# Patient Record
Sex: MALE | Race: WHITE | NOT HISPANIC OR LATINO | Employment: UNEMPLOYED | URBAN - NONMETROPOLITAN AREA
[De-identification: names, ages, dates, MRNs, and addresses within clinical notes are randomized per-mention and may not be internally consistent; named-entity substitution may affect disease eponyms.]

---

## 2017-08-28 ENCOUNTER — HOSPITAL ENCOUNTER (EMERGENCY)
Facility: HOSPITAL | Age: 42
Discharge: HOME | End: 2017-08-28
Attending: EMERGENCY MEDICINE | Admitting: EMERGENCY MEDICINE

## 2017-08-28 VITALS
HEART RATE: 109 BPM | SYSTOLIC BLOOD PRESSURE: 144 MMHG | DIASTOLIC BLOOD PRESSURE: 91 MMHG | BODY MASS INDEX: 24.33 KG/M2 | RESPIRATION RATE: 18 BRPM | HEIGHT: 67 IN | WEIGHT: 155 LBS | OXYGEN SATURATION: 97 % | TEMPERATURE: 98.1 F

## 2017-08-28 DIAGNOSIS — R36.9 PENILE DISCHARGE: Primary | ICD-10-CM

## 2017-08-28 LAB
BACTERIA UR QL AUTO: ABNORMAL /HPF
BILIRUB UR QL STRIP: NEGATIVE
CLARITY UR: ABNORMAL
COLOR UR: YELLOW
GLUCOSE UR STRIP-MCNC: NEGATIVE MG/DL
HGB UR QL STRIP.AUTO: ABNORMAL
HYALINE CASTS UR QL AUTO: ABNORMAL /LPF
KETONES UR QL STRIP: NEGATIVE
LEUKOCYTE ESTERASE UR QL STRIP.AUTO: ABNORMAL
MUCOUS THREADS URNS QL MICRO: ABNORMAL /HPF
NITRITE UR QL STRIP: NEGATIVE
PH UR STRIP.AUTO: 6 [PH] (ref 5–8)
PROT UR QL STRIP: NEGATIVE
RBC # UR: ABNORMAL /HPF
REF LAB TEST METHOD: ABNORMAL
SP GR UR STRIP: 1.02 (ref 1–1.03)
SQUAMOUS #/AREA URNS HPF: ABNORMAL /HPF
UROBILINOGEN UR QL STRIP: ABNORMAL
WBC CLUMPS # UR AUTO: ABNORMAL /HPF
WBC UR QL AUTO: ABNORMAL /HPF

## 2017-08-28 PROCEDURE — 96372 THER/PROPH/DIAG INJ SC/IM: CPT

## 2017-08-28 PROCEDURE — 87086 URINE CULTURE/COLONY COUNT: CPT | Performed by: EMERGENCY MEDICINE

## 2017-08-28 PROCEDURE — 99283 EMERGENCY DEPT VISIT LOW MDM: CPT

## 2017-08-28 PROCEDURE — 81001 URINALYSIS AUTO W/SCOPE: CPT | Performed by: EMERGENCY MEDICINE

## 2017-08-28 PROCEDURE — 25010000002 CEFTRIAXONE PER 250 MG: Performed by: EMERGENCY MEDICINE

## 2017-08-28 PROCEDURE — 87591 N.GONORRHOEAE DNA AMP PROB: CPT | Performed by: EMERGENCY MEDICINE

## 2017-08-28 PROCEDURE — 87491 CHLMYD TRACH DNA AMP PROBE: CPT | Performed by: EMERGENCY MEDICINE

## 2017-08-28 RX ORDER — CEFTRIAXONE SODIUM 250 MG/1
250 INJECTION, POWDER, FOR SOLUTION INTRAMUSCULAR; INTRAVENOUS ONCE
Status: COMPLETED | OUTPATIENT
Start: 2017-08-28 | End: 2017-08-28

## 2017-08-28 RX ORDER — DOXYCYCLINE 100 MG/1
200 CAPSULE ORAL ONCE
Status: COMPLETED | OUTPATIENT
Start: 2017-08-28 | End: 2017-08-28

## 2017-08-28 RX ORDER — LIDOCAINE HYDROCHLORIDE 10 MG/ML
0.9 INJECTION, SOLUTION EPIDURAL; INFILTRATION; INTRACAUDAL; PERINEURAL ONCE
Status: COMPLETED | OUTPATIENT
Start: 2017-08-28 | End: 2017-08-28

## 2017-08-28 RX ORDER — AZITHROMYCIN 250 MG/1
2000 TABLET, FILM COATED ORAL ONCE
Status: COMPLETED | OUTPATIENT
Start: 2017-08-28 | End: 2017-08-28

## 2017-08-28 RX ORDER — AZITHROMYCIN 250 MG/1
1000 TABLET, FILM COATED ORAL ONCE
Status: DISCONTINUED | OUTPATIENT
Start: 2017-08-28 | End: 2017-08-28

## 2017-08-28 RX ORDER — DOXYCYCLINE HYCLATE 50 MG/1
200 CAPSULE ORAL ONCE
Status: DISCONTINUED | OUTPATIENT
Start: 2017-08-28 | End: 2017-08-28

## 2017-08-28 RX ORDER — ONDANSETRON 4 MG/1
4 TABLET, ORALLY DISINTEGRATING ORAL ONCE
Status: COMPLETED | OUTPATIENT
Start: 2017-08-28 | End: 2017-08-28

## 2017-08-28 RX ORDER — METRONIDAZOLE 500 MG/1
2000 TABLET ORAL ONCE
Status: COMPLETED | OUTPATIENT
Start: 2017-08-28 | End: 2017-08-28

## 2017-08-28 RX ADMIN — CEFTRIAXONE SODIUM 250 MG: 250 INJECTION, POWDER, FOR SOLUTION INTRAMUSCULAR; INTRAVENOUS at 14:30

## 2017-08-28 RX ADMIN — LIDOCAINE HYDROCHLORIDE 0.9 ML: 10 INJECTION, SOLUTION EPIDURAL; INFILTRATION; INTRACAUDAL; PERINEURAL at 14:31

## 2017-08-28 RX ADMIN — METRONIDAZOLE 2000 MG: 500 TABLET ORAL at 14:55

## 2017-08-28 RX ADMIN — AZITHROMYCIN 2000 MG: 250 TABLET, FILM COATED ORAL at 14:55

## 2017-08-28 RX ADMIN — DOXYCYCLINE 200 MG: 100 CAPSULE ORAL at 14:31

## 2017-08-28 RX ADMIN — ONDANSETRON 4 MG: 4 TABLET, ORALLY DISINTEGRATING ORAL at 15:00

## 2017-08-29 LAB
C TRACH RRNA SPEC DONR QL NAA+PROBE: NEGATIVE
N GONORRHOEA DNA SPEC QL NAA+PROBE: POSITIVE

## 2017-08-30 LAB — BACTERIA SPEC AEROBE CULT: NO GROWTH

## 2021-11-03 ENCOUNTER — HOSPITAL ENCOUNTER (EMERGENCY)
Facility: HOSPITAL | Age: 46
Discharge: HOME | End: 2021-11-03
Attending: EMERGENCY MEDICINE | Admitting: EMERGENCY MEDICINE

## 2021-11-03 VITALS
SYSTOLIC BLOOD PRESSURE: 157 MMHG | OXYGEN SATURATION: 98 % | TEMPERATURE: 98.4 F | DIASTOLIC BLOOD PRESSURE: 98 MMHG | WEIGHT: 180 LBS | HEIGHT: 66 IN | HEART RATE: 110 BPM | BODY MASS INDEX: 28.93 KG/M2 | RESPIRATION RATE: 18 BRPM

## 2021-11-03 DIAGNOSIS — Z20.2 EXPOSURE TO STD: Primary | ICD-10-CM

## 2021-11-03 LAB
BILIRUB UR QL STRIP: NEGATIVE
CLARITY UR: CLEAR
COLOR UR: YELLOW
GLUCOSE UR STRIP-MCNC: NEGATIVE MG/DL
HGB UR QL STRIP.AUTO: NEGATIVE
KETONES UR QL STRIP: NEGATIVE
LEUKOCYTE ESTERASE UR QL STRIP.AUTO: NEGATIVE
NITRITE UR QL STRIP: NEGATIVE
PH UR STRIP.AUTO: 5.5 [PH] (ref 5–8)
PROT UR QL STRIP: NEGATIVE
SP GR UR STRIP: <=1.005 (ref 1–1.03)
UROBILINOGEN UR QL STRIP: NORMAL

## 2021-11-03 PROCEDURE — 87591 N.GONORRHOEAE DNA AMP PROB: CPT | Performed by: PHYSICIAN ASSISTANT

## 2021-11-03 PROCEDURE — 81003 URINALYSIS AUTO W/O SCOPE: CPT | Performed by: PHYSICIAN ASSISTANT

## 2021-11-03 PROCEDURE — 87491 CHLMYD TRACH DNA AMP PROBE: CPT | Performed by: PHYSICIAN ASSISTANT

## 2021-11-03 PROCEDURE — 25010000002 CEFTRIAXONE PER 250 MG: Performed by: PHYSICIAN ASSISTANT

## 2021-11-03 PROCEDURE — 96372 THER/PROPH/DIAG INJ SC/IM: CPT

## 2021-11-03 PROCEDURE — 99283 EMERGENCY DEPT VISIT LOW MDM: CPT

## 2021-11-03 RX ORDER — LIDOCAINE HYDROCHLORIDE 10 MG/ML
2.1 INJECTION, SOLUTION EPIDURAL; INFILTRATION; INTRACAUDAL; PERINEURAL ONCE
Status: COMPLETED | OUTPATIENT
Start: 2021-11-03 | End: 2021-11-03

## 2021-11-03 RX ORDER — DOXYCYCLINE 100 MG/1
100 CAPSULE ORAL 2 TIMES DAILY
Qty: 20 CAPSULE | Refills: 0 | Status: SHIPPED | OUTPATIENT
Start: 2021-11-03 | End: 2021-11-13

## 2021-11-03 RX ORDER — CEFTRIAXONE 1 G/1
1000 INJECTION, POWDER, FOR SOLUTION INTRAMUSCULAR; INTRAVENOUS ONCE
Status: COMPLETED | OUTPATIENT
Start: 2021-11-03 | End: 2021-11-03

## 2021-11-03 RX ORDER — AZITHROMYCIN 250 MG/1
1000 TABLET, FILM COATED ORAL ONCE
Status: COMPLETED | OUTPATIENT
Start: 2021-11-03 | End: 2021-11-03

## 2021-11-03 RX ADMIN — LIDOCAINE HYDROCHLORIDE 2.1 ML: 10 INJECTION, SOLUTION EPIDURAL; INFILTRATION; INTRACAUDAL; PERINEURAL at 13:01

## 2021-11-03 RX ADMIN — CEFTRIAXONE SODIUM 1000 MG: 1 INJECTION, POWDER, FOR SOLUTION INTRAMUSCULAR; INTRAVENOUS at 13:00

## 2021-11-03 RX ADMIN — AZITHROMYCIN MONOHYDRATE 1000 MG: 250 TABLET ORAL at 13:00

## 2021-11-03 NOTE — ED PROVIDER NOTES
Subjective   45-year-old male who presents to the emergency department chief complaint possible exposure to STD.  Patient states he has had increased urinary frequency, dysuria.  Patient denies any discharge.  Does state he had sex with a new sexual partner 5 days ago.      History provided by:  Patient   used: No    Exposure to STD  Location:  Burning   Severity:  Moderate  Onset quality:  Gradual  Duration:  5 days  Timing:  Intermittent  Progression:  Worsening  Chronicity:  New  Associated symptoms: no abdominal pain, no chest pain, no congestion, no cough, no diarrhea, no headaches, no loss of consciousness, no myalgias, no rhinorrhea, no shortness of breath, no sore throat and no vomiting        Review of Systems   Constitutional: Negative.  Negative for chills and diaphoresis.   HENT: Negative.  Negative for congestion, rhinorrhea and sore throat.    Eyes: Negative.  Negative for pain, redness and itching.   Respiratory: Negative.  Negative for cough and shortness of breath.    Cardiovascular: Negative.  Negative for chest pain.   Gastrointestinal: Negative.  Negative for abdominal pain, blood in stool, constipation, diarrhea and vomiting.   Endocrine: Negative.  Negative for heat intolerance, polydipsia, polyphagia and polyuria.   Genitourinary: Positive for difficulty urinating, dysuria, frequency and penile pain. Negative for flank pain, genital sores, hematuria and penile discharge.   Musculoskeletal: Negative.  Negative for back pain, gait problem and myalgias.   Skin: Negative.  Negative for color change and pallor.   Neurological: Negative.  Negative for loss of consciousness, speech difficulty, light-headedness, numbness and headaches.   Hematological: Negative.    Psychiatric/Behavioral: Negative.  Negative for behavioral problems, confusion, decreased concentration, dysphoric mood and hallucinations. The patient is not nervous/anxious and is not hyperactive.    All other systems  reviewed and are negative.      History reviewed. No pertinent past medical history.    Allergies   Allergen Reactions   • Codeine Itching   • Penicillins Unknown - Low Severity       History reviewed. No pertinent surgical history.    History reviewed. No pertinent family history.    Social History     Socioeconomic History   • Marital status: Single   Tobacco Use   • Smoking status: Never Smoker   Substance and Sexual Activity   • Alcohol use: Yes   • Drug use: Yes     Types: Marijuana           Objective   Physical Exam  Vitals and nursing note reviewed.   Constitutional:       General: He is not in acute distress.     Appearance: Normal appearance. He is normal weight. He is not ill-appearing, toxic-appearing or diaphoretic.   HENT:      Head: Normocephalic and atraumatic.      Right Ear: Tympanic membrane, ear canal and external ear normal. There is no impacted cerumen.      Left Ear: Tympanic membrane, ear canal and external ear normal. There is no impacted cerumen.      Nose: Nose normal. No congestion or rhinorrhea.      Mouth/Throat:      Mouth: Mucous membranes are moist.      Pharynx: Oropharynx is clear. No oropharyngeal exudate or posterior oropharyngeal erythema.   Eyes:      General: No scleral icterus.        Right eye: No discharge.         Left eye: No discharge.      Conjunctiva/sclera: Conjunctivae normal.      Pupils: Pupils are equal, round, and reactive to light.   Cardiovascular:      Rate and Rhythm: Normal rate and regular rhythm.      Pulses: Normal pulses.      Heart sounds: No murmur heard.  No friction rub. No gallop.    Pulmonary:      Effort: Pulmonary effort is normal. No respiratory distress.      Breath sounds: Normal breath sounds. No stridor. No wheezing, rhonchi or rales.   Chest:      Chest wall: No tenderness.   Abdominal:      General: Abdomen is flat. Bowel sounds are normal. There is no distension.      Palpations: Abdomen is soft. There is no mass.      Tenderness: There  is no abdominal tenderness. There is no right CVA tenderness, left CVA tenderness, guarding or rebound.      Hernia: No hernia is present.   Musculoskeletal:         General: No swelling, tenderness, deformity or signs of injury. Normal range of motion.      Cervical back: Normal range of motion. No rigidity or tenderness.      Right lower leg: No edema.      Left lower leg: No edema.   Lymphadenopathy:      Cervical: No cervical adenopathy.   Skin:     General: Skin is warm and dry.      Capillary Refill: Capillary refill takes less than 2 seconds.      Coloration: Skin is not jaundiced or pale.      Findings: No bruising, erythema, lesion or rash.   Neurological:      General: No focal deficit present.      Mental Status: He is alert and oriented to person, place, and time. Mental status is at baseline.      Cranial Nerves: No cranial nerve deficit.      Sensory: No sensory deficit.      Motor: No weakness.      Coordination: Coordination normal.      Gait: Gait normal.      Deep Tendon Reflexes: Reflexes normal.   Psychiatric:         Mood and Affect: Mood normal.         Behavior: Behavior normal.         Thought Content: Thought content normal.         Judgment: Judgment normal.         Procedures           ED Course                                           MDM    Final diagnoses:   Exposure to STD       ED Disposition  ED Disposition     ED Disposition Condition Comment    Discharge Stable           [address] [place] [address] [phone]  Call in 1 day           Medication List      New Prescriptions    doxycycline 100 MG capsule  Commonly known as: MONODOX  Take 1 capsule by mouth 2 (Two) Times a Day for 10 days.           Where to Get Your Medications      You can get these medications from any pharmacy    Bring a paper prescription for each of these medications  · doxycycline 100 MG capsule          Jas Mesa PA-C  11/03/21 9613

## 2021-11-04 LAB
C TRACH RRNA SPEC QL NAA+PROBE: NEGATIVE
N GONORRHOEA RRNA SPEC QL NAA+PROBE: NEGATIVE

## 2023-05-17 ENCOUNTER — HOSPITAL ENCOUNTER (EMERGENCY)
Facility: HOSPITAL | Age: 48
Discharge: HOME | End: 2023-05-17
Attending: EMERGENCY MEDICINE
Payer: MEDICAID

## 2023-05-17 VITALS
DIASTOLIC BLOOD PRESSURE: 99 MMHG | BODY MASS INDEX: 27.58 KG/M2 | SYSTOLIC BLOOD PRESSURE: 166 MMHG | RESPIRATION RATE: 18 BRPM | HEART RATE: 118 BPM | WEIGHT: 182 LBS | TEMPERATURE: 97.6 F | HEIGHT: 68 IN | OXYGEN SATURATION: 96 %

## 2023-05-17 DIAGNOSIS — Z20.2 POSSIBLE EXPOSURE TO STD: Primary | ICD-10-CM

## 2023-05-17 DIAGNOSIS — R30.0 DYSURIA: ICD-10-CM

## 2023-05-17 LAB
BILIRUB UR QL STRIP: NEGATIVE
CLARITY UR: CLEAR
COLOR UR: YELLOW
GLUCOSE UR STRIP-MCNC: NEGATIVE MG/DL
HGB UR QL STRIP.AUTO: NEGATIVE
KETONES UR QL STRIP: ABNORMAL
LEUKOCYTE ESTERASE UR QL STRIP.AUTO: NEGATIVE
NITRITE UR QL STRIP: NEGATIVE
PH UR STRIP.AUTO: 5.5 [PH] (ref 5–8)
PROT UR QL STRIP: NEGATIVE
SP GR UR STRIP: <=1.005 (ref 1–1.03)
UROBILINOGEN UR QL STRIP: ABNORMAL

## 2023-05-17 PROCEDURE — 99283 EMERGENCY DEPT VISIT LOW MDM: CPT

## 2023-05-17 PROCEDURE — 25010000002 CEFTRIAXONE PER 250 MG: Performed by: PHYSICIAN ASSISTANT

## 2023-05-17 PROCEDURE — 81003 URINALYSIS AUTO W/O SCOPE: CPT | Performed by: EMERGENCY MEDICINE

## 2023-05-17 PROCEDURE — 96372 THER/PROPH/DIAG INJ SC/IM: CPT

## 2023-05-17 PROCEDURE — 87491 CHLMYD TRACH DNA AMP PROBE: CPT | Performed by: EMERGENCY MEDICINE

## 2023-05-17 PROCEDURE — 87591 N.GONORRHOEAE DNA AMP PROB: CPT | Performed by: EMERGENCY MEDICINE

## 2023-05-17 RX ORDER — DOXYCYCLINE 100 MG/1
100 CAPSULE ORAL 2 TIMES DAILY
Qty: 14 CAPSULE | Refills: 0 | Status: SHIPPED | OUTPATIENT
Start: 2023-05-17 | End: 2023-05-24

## 2023-05-17 RX ORDER — DOXYCYCLINE 100 MG/1
100 CAPSULE ORAL ONCE
Status: COMPLETED | OUTPATIENT
Start: 2023-05-17 | End: 2023-05-17

## 2023-05-17 RX ADMIN — DOXYCYCLINE 100 MG: 100 CAPSULE ORAL at 13:07

## 2023-05-17 RX ADMIN — LIDOCAINE HYDROCHLORIDE 500 MG: 10 INJECTION, SOLUTION EPIDURAL; INFILTRATION; INTRACAUDAL; PERINEURAL at 13:07

## 2023-05-17 NOTE — DISCHARGE INSTRUCTIONS
Your gonorrhea and Chlamydia test are pending.  If they are positive you will receive a call in the next few days.  You were prophylactically treated for STDs today with antibiotics.  You will need to continue the doxycycline as directed until medication is complete.  Make sure you inform all of your sexual partners if your tests are positive.  Make sure you complete the course of antibiotics until you return to sexual intercourse.  Return to ER for any change, worsening symptoms, or any additional concerns including but not limited to fever, chills, abdominal pain, testicular pain or swelling, intractable vomiting.

## 2023-05-17 NOTE — ED PROVIDER NOTES
"Subjective  History of Present Illness:    Chief Complaint: Concern for STD  History of Present Illness: Patient is a 47-year-old male with history of anxiety presenting to the ER for evaluation of concern for STD.  Patient states he recently had unprotected sex with a new sexual partner.  He states he has had some dysuria and was concern for STD.  He denies any penile discharge, lesions, testicular pain or swelling.  He denies any flank pain, abdominal pain, fever, chills, nausea, emesis.  Onset: Few days  Duration: Persistent  Exacerbating / Alleviating factors: None  Associated symptoms: None      Nurses Notes reviewed and agree, including vitals, allergies, social history and prior medical history.     REVIEW OF SYSTEMS: All systems reviewed and not pertinent unless noted.  Review of Systems      Positive for: Dysuria    Negative for: Fever, chills, abdominal pain, flank pain, testicular pain or swelling, penile drainage or lesion    Past Medical History:   Diagnosis Date   • Anxiety        Allergies:    Codeine and Penicillins      History reviewed. No pertinent surgical history.      Social History     Socioeconomic History   • Marital status: Single   Tobacco Use   • Smoking status: Never   Vaping Use   • Vaping Use: Never used   Substance and Sexual Activity   • Alcohol use: Yes   • Drug use: Yes     Types: Marijuana   • Sexual activity: Yes     Partners: Female         History reviewed. No pertinent family history.    Objective  Physical Exam:  /99 (BP Location: Left arm, Patient Position: Sitting)   Pulse 118   Temp 97.6 °F (36.4 °C) (Oral)   Resp 18   Ht 172.7 cm (68\")   Wt 82.6 kg (182 lb)   SpO2 96%   BMI 27.67 kg/m²      Physical Exam    CONSTITUTIONAL: Well developed, no acute distress, nontoxic in appearance.  He does appear anxious and is pacing around the room.  VITAL SIGNS: per nursing, reviewed and noted  SKIN: exposed skin with no rashes, ulcerations or petechiae  EYES: Grossly " EOMI, no icterus  ENT: Normal voice.  No facial asymmetry, nares are patent  RESPIRATORY:  No increased work of breathing. No retractions. Lung sounds are clear bilaterally  CARDIOVASCULAR: Tachycardic, regular rhythm  GI: Abdomen soft, nontender to palpation  MUSCULOSKELETAL:  No tenderness. Full ROM. Strength and tone grossly normal.  no spasms.   NEUROLOGIC: Alert, oriented x 3. No gross deficits. GCS 15.   PSYCH: appropriate affect.  : Refused exam      Procedures    ED Course:        ED Course as of 05/17/23 1333   Wed May 17, 2023   1303 Patient refused  exam. Wants prophylactic treatment for STI. Has had ceftriaxone in the past. [LR]   1304 Color, UA: Yellow [LR]   1315 Appearance, UA: Clear [LR]   1315 pH, UA: 5.5 [LR]   1315 Specific Gravity, UA: <=1.005 [LR]   1315 Glucose: Negative [LR]   1315 Ketones, UA(!): 15 mg/dL (1+) [LR]   1315 Bilirubin, UA: Negative [LR]   1315 Blood, UA: Negative [LR]   1315 Protein, UA: Negative [LR]   1315 Leukocytes, UA: Negative [LR]   1315 Nitrite, UA: Negative [LR]   1315 Urobilinogen, UA: 0.2 E.U./dL [LR]      ED Course User Index  [LR] Kay Morrissey PA-C       Lab Results (last 24 hours)     Procedure Component Value Units Date/Time    Urinalysis With Microscopic If Indicated (No Culture) - Urine, Clean Catch [[id number]]  (Abnormal) Collected: 05/17/23 1254    Specimen: Urine, Clean Catch Updated: 05/17/23 1303     Color, UA Yellow     Appearance, UA Clear     pH, UA 5.5     Specific Gravity, UA <=1.005     Glucose, UA Negative     Ketones, UA 15 mg/dL (1+)     Bilirubin, UA Negative     Blood, UA Negative     Protein, UA Negative     Leuk Esterase, UA Negative     Nitrite, UA Negative     Urobilinogen, UA 0.2 E.U./dL    Narrative:      Urine microscopic not indicated.    Chlamydia trachomatis, Neisseria gonorrhoeae, PCR - Urine, Urine, Random Void [[id number]] Collected: 05/17/23 1254    Specimen: Urine, Random Void Updated: 05/17/23 1258           No radiology  results from the last 24 hrs       MDM    Initial impression of presenting illness: Patient is a 47-year-old male presenting to the ER for evaluation of dysuria and concern for STD.    DDX: includes but is not limited to: STI, urinary tract infection, and other concerns.  He has no flank pain or abdominal pain concerning for nephrolithiasis other intra-abdominal infection.  Denies any testicular pain or swelling concerning for orchitis or testicular torsion    Patient arrives in stable condition with vitals interpreted by myself.     Pertinent features from physical exam: No abdominal tenderness, appears anxious, afebrile, tachycardic, patient refused  exam    Initial diagnostic plan: Will send urinalysis and gonorrhea and chlamydia testing.  Patient does want prophylactic treatment for STDs    Results from initial plan were reviewed and interpreted by me revealing ketones in the urine without signs of infection.  Gonorrhea and chlamydia pending.    Diagnostic information from other sources: Chart review    Interventions / Re-evaluation: Patient was given ceftriaxone, doxycycline, will call in additional doxycycline prescription.  Discussed very strict return precautions.  He verbalized understanding and was in agreement with this plan of care.    Results/clinical rationale were discussed with patient. He verbalized understanding and was in agreement with this plan of care.    Consultations/Discussion of results with other physicians: None    Disposition plan: Discharge  -----    Final diagnoses:   Possible exposure to STD   Dysuria        Kay Morrissey PA-C  05/17/23 2213       Kay Morrissey PA-C  05/17/23 2304

## 2023-05-18 LAB
C TRACH RRNA SPEC QL NAA+PROBE: NEGATIVE
N GONORRHOEA RRNA SPEC QL NAA+PROBE: NEGATIVE

## 2024-04-16 ENCOUNTER — HOSPITAL ENCOUNTER (EMERGENCY)
Facility: HOSPITAL | Age: 49
Discharge: HOME | End: 2024-04-16
Attending: STUDENT IN AN ORGANIZED HEALTH CARE EDUCATION/TRAINING PROGRAM | Admitting: STUDENT IN AN ORGANIZED HEALTH CARE EDUCATION/TRAINING PROGRAM
Payer: MEDICARE

## 2024-04-16 VITALS
BODY MASS INDEX: 28.11 KG/M2 | DIASTOLIC BLOOD PRESSURE: 109 MMHG | HEIGHT: 68 IN | OXYGEN SATURATION: 99 % | HEART RATE: 102 BPM | SYSTOLIC BLOOD PRESSURE: 160 MMHG | TEMPERATURE: 97.6 F | WEIGHT: 185.5 LBS | RESPIRATION RATE: 18 BRPM

## 2024-04-16 DIAGNOSIS — R30.0 DYSURIA: Primary | ICD-10-CM

## 2024-04-16 LAB
BILIRUB UR QL STRIP: NEGATIVE
CLARITY UR: CLEAR
COLOR UR: YELLOW
GLUCOSE UR STRIP-MCNC: NEGATIVE MG/DL
HGB UR QL STRIP.AUTO: NEGATIVE
KETONES UR QL STRIP: NEGATIVE
LEUKOCYTE ESTERASE UR QL STRIP.AUTO: NEGATIVE
NITRITE UR QL STRIP: NEGATIVE
PH UR STRIP.AUTO: 6 [PH] (ref 5–8)
PROT UR QL STRIP: NEGATIVE
SP GR UR STRIP: <=1.005 (ref 1–1.03)
UROBILINOGEN UR QL STRIP: NORMAL

## 2024-04-16 PROCEDURE — 81003 URINALYSIS AUTO W/O SCOPE: CPT

## 2024-04-16 PROCEDURE — 87591 N.GONORRHOEAE DNA AMP PROB: CPT

## 2024-04-16 PROCEDURE — 99283 EMERGENCY DEPT VISIT LOW MDM: CPT

## 2024-04-16 PROCEDURE — 96372 THER/PROPH/DIAG INJ SC/IM: CPT

## 2024-04-16 PROCEDURE — 87491 CHLMYD TRACH DNA AMP PROBE: CPT

## 2024-04-16 PROCEDURE — 25010000002 CEFTRIAXONE PER 250 MG

## 2024-04-16 RX ORDER — DOXYCYCLINE 100 MG/1
100 CAPSULE ORAL ONCE
Status: COMPLETED | OUTPATIENT
Start: 2024-04-16 | End: 2024-04-16

## 2024-04-16 RX ORDER — METRONIDAZOLE 500 MG/1
500 TABLET ORAL 2 TIMES DAILY
Qty: 14 TABLET | Refills: 0 | Status: SHIPPED | OUTPATIENT
Start: 2024-04-16 | End: 2024-04-23

## 2024-04-16 RX ORDER — DOXYCYCLINE 100 MG/1
100 CAPSULE ORAL 2 TIMES DAILY
Qty: 14 CAPSULE | Refills: 0 | Status: SHIPPED | OUTPATIENT
Start: 2024-04-16 | End: 2024-04-23

## 2024-04-16 RX ADMIN — LIDOCAINE HYDROCHLORIDE 500 MG: 10 INJECTION, SOLUTION EPIDURAL; INFILTRATION; INTRACAUDAL; PERINEURAL at 13:07

## 2024-04-16 RX ADMIN — DOXYCYCLINE 100 MG: 100 CAPSULE ORAL at 13:07

## 2024-04-16 NOTE — ED PROVIDER NOTES
"Subjective  History of Present Illness:    Is a 48 remembers emergency room today for evaluation of possible exposure to STD.  He reports that after having sexual intercourse with a new partner, about 3 days later, he started to have some burning like sensations at the urethra.  He does not know about any known history of STI another patient but he does report that he has had a prior history of gonorrhea in the past.  He denies any penile pain testicular pain new lesions in the genital area or penile discharge.  No fevers.  No hematuria, frequency or urgency.  No recent urologic procedures.       Nurses Notes reviewed and agree, including vitals, allergies, social history and prior medical history.     REVIEW OF SYSTEMS: All systems reviewed and not pertinent unless noted.  Review of Systems   Constitutional:  Negative for fever.   Gastrointestinal:  Negative for nausea and vomiting.   Genitourinary:  Positive for dysuria. Negative for difficulty urinating, flank pain, frequency, genital sores, hematuria, penile discharge, penile pain, penile swelling, scrotal swelling, testicular pain and urgency.   All other systems reviewed and are negative.      Past Medical History:   Diagnosis Date    Anxiety        Allergies:    Codeine and Penicillins      No past surgical history on file.      Social History     Socioeconomic History    Marital status: Single   Tobacco Use    Smoking status: Never   Vaping Use    Vaping status: Never Used   Substance and Sexual Activity    Alcohol use: Yes    Drug use: Yes     Types: Marijuana    Sexual activity: Yes     Partners: Female         No family history on file.    Objective  Physical Exam:  BP (!) 160/109 (BP Location: Left arm, Patient Position: Sitting)   Pulse 102   Temp 97.6 °F (36.4 °C) (Oral)   Resp 18   Ht 172.7 cm (68\")   Wt 84.1 kg (185 lb 8 oz)   SpO2 99%   BMI 28.21 kg/m²      Physical Exam  Vitals and nursing note reviewed. Exam conducted with a chaperone " present.   Constitutional:       General: He is not in acute distress.     Appearance: Normal appearance. He is normal weight. He is not ill-appearing, toxic-appearing or diaphoretic.   HENT:      Head: Normocephalic and atraumatic.      Nose: Nose normal.      Mouth/Throat:      Mouth: Mucous membranes are moist.      Pharynx: Oropharynx is clear.   Eyes:      Extraocular Movements: Extraocular movements intact.   Cardiovascular:      Pulses: Normal pulses.      Comments: Appears well-perfused  Pulmonary:      Effort: Pulmonary effort is normal. No respiratory distress.   Abdominal:      General: Abdomen is flat.   Genitourinary:     Penis: Normal.       Testes: Normal.      Comments: No discharge expressed from penile shaft. No erythema of penis or chancre like lesions. Nursing RN female present at bedside for eval. No testicular pain or TTP.   Musculoskeletal:         General: Normal range of motion.      Cervical back: Normal range of motion.   Skin:     General: Skin is warm.      Capillary Refill: Capillary refill takes less than 2 seconds.   Neurological:      General: No focal deficit present.      Mental Status: He is alert and oriented to person, place, and time.   Psychiatric:         Mood and Affect: Mood normal.         Behavior: Behavior normal.         Thought Content: Thought content normal.         Judgment: Judgment normal.             Procedures    ED Course:         Lab Results (last 24 hours)       Procedure Component Value Units Date/Time    Urinalysis With Culture If Indicated - Urine, Clean Catch [[id number]]  (Normal) Collected: 04/16/24 1240    Specimen: Urine, Clean Catch Updated: 04/16/24 1253     Color, UA Yellow     Appearance, UA Clear     pH, UA 6.0     Specific Gravity, UA <=1.005     Glucose, UA Negative     Ketones, UA Negative     Bilirubin, UA Negative     Blood, UA Negative     Protein, UA Negative     Leuk Esterase, UA Negative     Nitrite, UA Negative     Urobilinogen, UA 0.2  E.U./dL    Narrative:      In absence of clinical symptoms, the presence of pyuria, bacteria, and/or nitrites on the urinalysis result does not correlate with infection.  Urine microscopic not indicated.    Chlamydia trachomatis, Neisseria gonorrhoeae, PCR - Urine, Urine, Clean Catch [[id number]] Collected: 04/16/24 1240    Specimen: Urine, Clean Catch Updated: 04/16/24 1244             No radiology results from the last 24 hrs       MDM      Initial impression of presenting illness: This a 48-year-old male present emergency room today for evaluation of possible exposure to STD and dysuria.    DDX: includes but is not limited to: STI exposure, urinary tract infection, sexually transmitted disease, urethral irritation, urethritis, others    Patient arrives hemodynamically stable afebrile mildly tachycardic at a rate of 102 nonhypoxic on room air nontoxic-appearing with vitals interpreted by myself.     Pertinent features from physical exam: Abdomen soft nondistended nontender.  Normal penile and scrotal exam.No discharge expressed from penile shaft. No erythema of penis or chancre like lesions. Nursing RN female present at bedside for eval. No testicular pain or TTP.     Initial diagnostic plan: Urinalysis, urine chlamydia gonorrhea    Results from initial plan were reviewed and interpreted by me revealing urinalysis negative for infection.  Urine chlamydia gonorrhea panel pending.    Diagnostic information from other sources: Record reviewed    Interventions / Re-evaluation: Prophylaxis with Rocephin and doxycycline.  Stable for discharge.  Offered Uro-Jet for discomfort, patient declined.    Results/clinical rationale were discussed with patient at bedside.  discussed importance of not having sexual relations until both partners are treated and complete.  Gave return precautions.  Recommend follow-up with primary care physician.    Consultations/Discussion of results with other physicians: N/A    Disposition plan:  Discharge.  Follow-up with primary care physician.  Return for worsening symptoms.  Sent doxycycline twice daily for 7 days and will also send in Flagyl to cover for trichomoniasis..  Patient was agreeable to the plan at bedside and discharged in appropriate condition.  -----    Final diagnoses:   Dysuria          Lucius Angeles PA-C  04/16/24 125

## 2024-04-16 NOTE — DISCHARGE INSTRUCTIONS
Follow-up closely with your primary care physician.  Important to finish the full course of antibiotics and your partner also needs to be treated before having repeat sexual relations.  Make sure to take the antibiotics as prescribed.

## 2024-04-18 ENCOUNTER — TRANSCRIBE ORDERS (OUTPATIENT)
Dept: GENERAL RADIOLOGY | Facility: HOSPITAL | Age: 49
End: 2024-04-18
Payer: MEDICARE

## 2024-04-18 ENCOUNTER — HOSPITAL ENCOUNTER (OUTPATIENT)
Dept: GENERAL RADIOLOGY | Facility: HOSPITAL | Age: 49
Discharge: HOME | End: 2024-04-18
Admitting: FAMILY MEDICINE
Payer: MEDICARE

## 2024-04-18 DIAGNOSIS — Z12.0 ENCOUNTER FOR SCREENING FOR GASTRIC CANCER: ICD-10-CM

## 2024-04-18 DIAGNOSIS — Z12.0 ENCOUNTER FOR SCREENING FOR GASTRIC CANCER: Primary | ICD-10-CM

## 2024-04-18 LAB
C TRACH RRNA SPEC QL NAA+PROBE: NEGATIVE
N GONORRHOEA RRNA SPEC QL NAA+PROBE: NEGATIVE

## 2024-04-18 PROCEDURE — 71046 X-RAY EXAM CHEST 2 VIEWS: CPT

## 2024-05-06 ENCOUNTER — PATIENT ROUNDING (BHMG ONLY) (OUTPATIENT)
Dept: PULMONOLOGY | Facility: CLINIC | Age: 49
End: 2024-05-06
Payer: MEDICARE

## 2024-05-06 ENCOUNTER — OFFICE VISIT (OUTPATIENT)
Dept: PULMONOLOGY | Facility: CLINIC | Age: 49
End: 2024-05-06
Payer: MEDICARE

## 2024-05-06 VITALS
HEART RATE: 87 BPM | HEIGHT: 68 IN | DIASTOLIC BLOOD PRESSURE: 84 MMHG | BODY MASS INDEX: 28.19 KG/M2 | OXYGEN SATURATION: 98 % | RESPIRATION RATE: 14 BRPM | WEIGHT: 186 LBS | SYSTOLIC BLOOD PRESSURE: 122 MMHG

## 2024-05-06 DIAGNOSIS — R91.1 LUNG NODULE, SOLITARY: Primary | ICD-10-CM

## 2024-05-06 DIAGNOSIS — R93.89 ABNORMAL CXR: ICD-10-CM

## 2024-05-06 PROCEDURE — 99203 OFFICE O/P NEW LOW 30 MIN: CPT | Performed by: INTERNAL MEDICINE

## 2024-05-06 NOTE — PROGRESS NOTES
"  CONSULT NOTE    Requested by:   No ref. provider found   Priscila Parker APRN      Chief Complaint   Patient presents with    Breathing Problem    Consult       Subjective:  Sajan Estrada is a 48 y.o. male.   Patient comes in today for consultation because of abnormal chest x-ray.    Patient underwent a chest x-ray due to vague unrelated symptoms. he was told that the imaging study showed a abnormality for which a pulmonology consultation was requested    The patient describes his brother who was diagnosed with lung cancer.      Patient says that he used to smoke 1/2 PPD for 15-20 years, but quit in 5 years.      The following portions of the patient's history were reviewed and updated as appropriate: allergies, current medications, past family history, past medical history, past social history, and past surgical history.    Review of Systems   Constitutional:  Negative for chills, fatigue and fever.   HENT:  Negative for sinus pressure, sneezing and sore throat.    Respiratory:  Negative for cough, chest tightness, shortness of breath and wheezing.    Cardiovascular:  Negative for palpitations and leg swelling.   All other systems reviewed and are negative.      Past Medical History:   Diagnosis Date    Anxiety        Social History     Tobacco Use    Smoking status: Former     Current packs/day: 0.00     Average packs/day: 0.5 packs/day for 14.7 years (7.3 ttl pk-yrs)     Types: Cigarettes     Start date: 2004     Quit date: 2019     Years since quittin.3    Smokeless tobacco: Not on file   Substance Use Topics    Alcohol use: Yes         Objective:  Visit Vitals  /84   Pulse 87   Resp 14   Ht 172.7 cm (68\") Comment: pt reported   Wt 84.4 kg (186 lb)   SpO2 98%   BMI 28.28 kg/m²       Physical Exam  Vitals reviewed.   Constitutional:       Appearance: He is well-developed.   HENT:      Mouth/Throat:      Mouth: Mucous membranes are moist.   Neck:      Thyroid: No thyromegaly.      Vascular: " No JVD.   Cardiovascular:      Rate and Rhythm: Normal rate and regular rhythm.      Heart sounds: No murmur heard.  Pulmonary:      Effort: Pulmonary effort is normal. No respiratory distress.      Breath sounds: No wheezing or rales.   Musculoskeletal:      Cervical back: Neck supple.      Comments: Gait was normal.   Skin:     General: Skin is warm and dry.   Neurological:      Mental Status: He is alert and oriented to person, place, and time.   Psychiatric:         Behavior: Behavior normal.           Assessment/Plan:  Diagnoses and all orders for this visit:    1. Lung nodule, solitary (Primary)  -     CT Chest Without Contrast Diagnostic; Future    2. Abnormal CXR  -     CT Chest Without Contrast Diagnostic; Future        Return in about 5 weeks (around 6/13/2024) for Recheck, Imaging, For Elenita Li).    DISCUSSION(if any):  Last chest x-ray was reviewed personally and the results were shared with the patient.  Images reviewed personally.   Results for orders placed during the hospital encounter of 04/18/24    XR Chest PA & Lateral    Narrative  PROCEDURE: XR CHEST PA AND LATERAL-    HISTORY: pain; Z12.0-Encounter for screening for malignant neoplasm of  stomach    COMPARISON: None.    FINDINGS: There is evidence of old calcified granulomatous disease.  Faint nodule is seen projecting near the anterior left 6th rib measuring  up to 12 mm. Right lung is clear.      There is no evidence of effusion or other pleural disease.  The  mediastinum has a normal appearance.    The cardiac silhouette is unremarkable.    Impression  Possible noncalcified nodule left lung base. Recommend  noncontrast chest CT.        This report was signed and finalized on 4/18/2024 10:54 PM by Jordan Salguero MD.      I have also reviewed his primary care / referring provider's last note that mentions lung nodule.     ===========================  ===========================    Laboratory workup has been requested from the  patient primary care provider multiple occasions but unfortunately they keep denying the request although we did inform them of the need to review laboratory workup to evaluate the patient and decide on a plan.    ===========================  ===========================    Based on the history obtained today, and based on the latest guidelines, the patient will need a full CT chest soon, after the last CT.    Based on the results of the CT scan, further recommendations will be made.    The patient was asked to call this office if he develops any weight loss, hemoptysis, night sweats etc.        Dictated utilizing Dragon dictation.    This document was electronically signed by Hilario Garcia MD on 05/06/24 at 11:11 EDT

## 2024-05-16 ENCOUNTER — TELEPHONE (OUTPATIENT)
Dept: PULMONOLOGY | Facility: CLINIC | Age: 49
End: 2024-05-16

## 2024-05-16 NOTE — TELEPHONE ENCOUNTER
Hub staff attempted to follow warm transfer process and was unsuccessful     Caller: [employer], [place]    Relationship to patient:     Best call back number: [phone] EXT: [phone]    Patient is needing: PA IS CODED WRONG AND APPEARS TO COME FROM A 3RD PARTY.  PLEASE CALL JENNIE TO CLEAR UP.

## 2024-06-13 ENCOUNTER — HOSPITAL ENCOUNTER (OUTPATIENT)
Dept: CT IMAGING | Facility: HOSPITAL | Age: 49
Discharge: HOME | End: 2024-06-13
Admitting: INTERNAL MEDICINE
Payer: MEDICARE

## 2024-06-13 DIAGNOSIS — R93.89 ABNORMAL CXR: ICD-10-CM

## 2024-06-13 DIAGNOSIS — R91.1 LUNG NODULE, SOLITARY: ICD-10-CM

## 2024-06-13 PROCEDURE — 71250 CT THORAX DX C-: CPT

## 2024-06-17 ENCOUNTER — OFFICE VISIT (OUTPATIENT)
Dept: PULMONOLOGY | Facility: CLINIC | Age: 49
End: 2024-06-17
Payer: MEDICARE

## 2024-06-17 VITALS
WEIGHT: 190 LBS | DIASTOLIC BLOOD PRESSURE: 70 MMHG | HEART RATE: 86 BPM | BODY MASS INDEX: 28.79 KG/M2 | SYSTOLIC BLOOD PRESSURE: 136 MMHG | OXYGEN SATURATION: 96 % | HEIGHT: 68 IN

## 2024-06-17 DIAGNOSIS — Z87.891 PERSONAL HISTORY OF NICOTINE DEPENDENCE: ICD-10-CM

## 2024-06-17 DIAGNOSIS — R93.89 ABNORMAL CXR: Primary | ICD-10-CM

## 2024-06-17 PROCEDURE — 99213 OFFICE O/P EST LOW 20 MIN: CPT | Performed by: NURSE PRACTITIONER

## 2024-06-17 NOTE — PROGRESS NOTES
Follow Up Office Visit      Patient Name: Sajan Estrada    Chief Complaint:    Chief Complaint   Patient presents with    Lung Nodule     Follow-up CT scan       History of Present Illness: Sajan Estrada is a 48 y.o. male who is here today for follow up of   Recent chest CT scan which was obtained to further evaluate abnormality noted on chest x-ray.  His chest CT scan did not reveal any worrisome findings in the lungs.  He denies any respiratory symptoms.  No fevers, no hemoptysis, no unintended weight loss.  He maintains smoking cessation.    Supplemental Oxygen: No    Subjective      Review of Systems:  Review of Systems   Constitutional:  Negative for fever and unexpected weight change.   Respiratory:  Negative for cough, shortness of breath and wheezing.    Cardiovascular:  Negative for chest pain and leg swelling.        Past Medical History:   Past Medical History:   Diagnosis Date    Anxiety        Past Surgical History: History reviewed. No pertinent surgical history.    Family History: History reviewed. No pertinent family history.    Social History:   Social History     Socioeconomic History    Marital status: Single   Tobacco Use    Smoking status: Former     Current packs/day: 0.00     Average packs/day: 0.5 packs/day for 14.7 years (7.3 ttl pk-yrs)     Types: Cigarettes     Start date: 2004     Quit date: 2019     Years since quittin.4     Passive exposure: Past   Vaping Use    Vaping status: Never Used   Substance and Sexual Activity    Alcohol use: Yes     Alcohol/week: 1.0 standard drink of alcohol     Types: 1 Cans of beer per week    Drug use: Not Currently     Types: Marijuana     Comment: last smoked marijuana around 2023    Sexual activity: Yes     Partners: Female       Current Medications:   No current outpatient medications on file.     Allergies:   Allergies   Allergen Reactions    Codeine Itching    Penicillins Unknown - Low Severity       Objective     Physical  "Exam:  Vital Signs:   Vitals:    06/17/24 1453   BP: 136/70   Pulse: 86   SpO2: 96%   Weight: 86.2 kg (190 lb)   Height: 172.7 cm (68\")     Body mass index is 28.89 kg/m².    Physical Exam  Vitals reviewed.   Constitutional:       General: He is not in acute distress.     Appearance: He is not toxic-appearing.   HENT:      Head: Normocephalic and atraumatic.      Mouth/Throat:      Mouth: Mucous membranes are moist.   Eyes:      Conjunctiva/sclera: Conjunctivae normal.   Cardiovascular:      Rate and Rhythm: Normal rate.      Heart sounds: Normal heart sounds.   Pulmonary:      Effort: Pulmonary effort is normal.      Breath sounds: Normal breath sounds.   Abdominal:      General: There is no distension.      Palpations: Abdomen is soft.   Musculoskeletal:         General: No swelling.      Cervical back: Neck supple.   Skin:     General: Skin is warm and dry.      Findings: No rash.   Neurological:      General: No focal deficit present.      Mental Status: He is alert and oriented to person, place, and time.   Psychiatric:         Mood and Affect: Mood normal.         Behavior: Behavior normal.       Results Review:   April 2024 chest x-ray showed possible noncalcified nodule in the left lung base.    June 2024 chest CT scan showed no suspicious infiltrate or nodule identified to correlate with the CXR finding which may have been due to nipple shadow.    Assessment / Plan      Assessment/Plan:   Diagnoses and all orders for this visit:    1. Abnormal CXR (Primary)  Further evaluated with chest CT scan, results noted above and discussed with patient in detail. No concerning finding to correlate with CXR result. He will discuss hepatic lesions with his PCP.    2. Personal history of nicotine dependence  Ongoing cessation advised       Follow Up:   Return if symptoms worsen or fail to improve.  The patient was counseled on diagnostic results, risks and benefits of treatment options, risk factor modifications and the " importance of treatment compliance. The patient was advised to contact the clinic with concerns or worsening symptoms.     CHELY Soriano   [facility] [place]     This document has been electronically signed by CHELY Soriano  June 17, 2024

## 2025-01-07 ENCOUNTER — HOSPITAL ENCOUNTER (EMERGENCY)
Facility: HOSPITAL | Age: 50
Discharge: HOME | End: 2025-01-07
Attending: EMERGENCY MEDICINE | Admitting: EMERGENCY MEDICINE
Payer: MEDICARE

## 2025-01-07 VITALS
DIASTOLIC BLOOD PRESSURE: 98 MMHG | TEMPERATURE: 98.2 F | SYSTOLIC BLOOD PRESSURE: 152 MMHG | RESPIRATION RATE: 16 BRPM | WEIGHT: 190.04 LBS | BODY MASS INDEX: 28.8 KG/M2 | OXYGEN SATURATION: 99 % | HEART RATE: 97 BPM | HEIGHT: 68 IN

## 2025-01-07 DIAGNOSIS — Z20.2 STD EXPOSURE: ICD-10-CM

## 2025-01-07 DIAGNOSIS — N30.00 ACUTE CYSTITIS WITHOUT HEMATURIA: Primary | ICD-10-CM

## 2025-01-07 LAB
BACTERIA UR QL AUTO: ABNORMAL /HPF
BILIRUB UR QL STRIP: NEGATIVE
CLARITY UR: ABNORMAL
COLOR UR: YELLOW
GLUCOSE UR STRIP-MCNC: NEGATIVE MG/DL
HGB UR QL STRIP.AUTO: ABNORMAL
HYALINE CASTS UR QL AUTO: ABNORMAL /LPF
KETONES UR QL STRIP: NEGATIVE
LEUKOCYTE ESTERASE UR QL STRIP.AUTO: ABNORMAL
NITRITE UR QL STRIP: POSITIVE
PH UR STRIP.AUTO: 5.5 [PH] (ref 5–8)
PROT UR QL STRIP: ABNORMAL
RBC # UR STRIP: ABNORMAL /HPF
REF LAB TEST METHOD: ABNORMAL
SP GR UR STRIP: 1.02 (ref 1–1.03)
SQUAMOUS #/AREA URNS HPF: ABNORMAL /HPF
UROBILINOGEN UR QL STRIP: ABNORMAL
WBC # UR STRIP: ABNORMAL /HPF

## 2025-01-07 PROCEDURE — 99283 EMERGENCY DEPT VISIT LOW MDM: CPT | Performed by: EMERGENCY MEDICINE

## 2025-01-07 PROCEDURE — 25010000002 CEFTRIAXONE PER 250 MG: Performed by: EMERGENCY MEDICINE

## 2025-01-07 PROCEDURE — 87491 CHLMYD TRACH DNA AMP PROBE: CPT | Performed by: EMERGENCY MEDICINE

## 2025-01-07 PROCEDURE — 81001 URINALYSIS AUTO W/SCOPE: CPT | Performed by: EMERGENCY MEDICINE

## 2025-01-07 PROCEDURE — 87591 N.GONORRHOEAE DNA AMP PROB: CPT | Performed by: EMERGENCY MEDICINE

## 2025-01-07 PROCEDURE — 87088 URINE BACTERIA CULTURE: CPT | Performed by: EMERGENCY MEDICINE

## 2025-01-07 PROCEDURE — 87086 URINE CULTURE/COLONY COUNT: CPT | Performed by: EMERGENCY MEDICINE

## 2025-01-07 PROCEDURE — 25010000002 LIDOCAINE PF 1% 1 % SOLUTION 5 ML VIAL: Performed by: EMERGENCY MEDICINE

## 2025-01-07 PROCEDURE — 87186 SC STD MICRODIL/AGAR DIL: CPT | Performed by: EMERGENCY MEDICINE

## 2025-01-07 PROCEDURE — 96372 THER/PROPH/DIAG INJ SC/IM: CPT

## 2025-01-07 RX ORDER — CEPHALEXIN 500 MG/1
500 CAPSULE ORAL 4 TIMES DAILY
Qty: 56 CAPSULE | Refills: 0 | Status: SHIPPED | OUTPATIENT
Start: 2025-01-07 | End: 2025-01-21

## 2025-01-07 RX ORDER — DOXYCYCLINE 100 MG/1
100 CAPSULE ORAL 2 TIMES DAILY
Qty: 14 CAPSULE | Refills: 0 | Status: SHIPPED | OUTPATIENT
Start: 2025-01-07 | End: 2025-01-14

## 2025-01-07 RX ADMIN — LIDOCAINE HYDROCHLORIDE 500 MG: 10 INJECTION, SOLUTION EPIDURAL; INFILTRATION; INTRACAUDAL; PERINEURAL at 13:31

## 2025-01-07 NOTE — ED PROVIDER NOTES
[facility] EMERGENCY DEPARTMENT  Emergency Department Encounter  Emergency Medicine Physician Note     Pt Name:Sajan Estrada  MRN: [id number]  Birthdate 1975  Date of evaluation: 2025  PCP:  Priscila Parker APRN  Note Started: 12:07 PM EST      CHIEF COMPLAINT       Chief Complaint   Patient presents with    Groin Pain       HISTORY OF PRESENT ILLNESS  (Location/Symptom, Timing/Onset, Context/Setting, Quality, Duration, Modifying Factors, Severity.)      Sajan Estrada is a 49 y.o. male who presents with Dysuria and pain after having sexual intercourse 3 days ago.  Patient describes painful urination, also describes discharge.  Patient denies any fevers chills abdominal pain change in bowel habit patient does report concerns for STD after sexual intercourse encounter.    PAST MEDICAL / SURGICAL / SOCIAL / FAMILY HISTORY     Past Medical History:   Diagnosis Date    Anxiety      No additional pertinent       History reviewed. No pertinent surgical history.  No additional pertinent       Social History     Socioeconomic History    Marital status: Single   Tobacco Use    Smoking status: Former     Current packs/day: 0.00     Average packs/day: 0.5 packs/day for 14.7 years (7.3 ttl pk-yrs)     Types: Cigarettes     Start date: 2004     Quit date: 2019     Years since quittin.0     Passive exposure: Past   Vaping Use    Vaping status: Never Used   Substance and Sexual Activity    Alcohol use: Yes     Alcohol/week: 1.0 standard drink of alcohol     Types: 1 Cans of beer per week     Comment: 5 beers per month    Drug use: Not Currently     Types: Marijuana     Comment: last smoked marijuana around 2023    Sexual activity: Yes     Partners: Female       History reviewed. No pertinent family history.    Allergies:  Codeine and Penicillins    Home Medications:  Prior to Admission medications    Not on File         REVIEW OF SYSTEMS       Review of Systems   Constitutional:  Yes " Negative for chills and fever.   Gastrointestinal:  Negative for abdominal pain.   Genitourinary:  Positive for dysuria and penile discharge. Negative for penile pain, penile swelling, scrotal swelling and testicular pain.       PHYSICAL EXAM      INITIAL VITALS:   /98 (BP Location: Left arm, Patient Position: Sitting)   Pulse 97   Temp 98.2 °F (36.8 °C) (Oral)   Resp 16   Ht 172.7 cm (67.99\")   Wt 86.2 kg (190 lb 0.6 oz)   SpO2 99%   BMI 28.90 kg/m²     Physical Exam  Constitutional:       Appearance: Normal appearance.   Abdominal:      Tenderness: There is no abdominal tenderness.   Genitourinary:     Penis: Normal.       Testes: Normal.      Comments: ED tech Daniel in the room for chaperone  Skin:     General: Skin is warm and dry.   Neurological:      General: No focal deficit present.      Mental Status: He is alert and oriented to person, place, and time.   Psychiatric:         Mood and Affect: Mood normal.         Behavior: Behavior normal.           DDX/DIAGNOSTIC RESULTS / EMERGENCY DEPARTMENT COURSE / MDM     Differential Diagnosis included but not limited: Acute cystitis, trichomonas, other STD including gonorrhea chlamydia.    Diagnoses Considered but Do Not Suspect: Low concern for severe infection including prostatitis and pyelonephritis    Decision Rules/Scores utilized: N/A     Tests considered but not ordered and why:  N/A     MIPS: N/A     Code Status Discussion:  Not Discussed    Additional Patient Education Provided: None     Medical Decision Making    Medical Decision Making  Urine demonstrated nitrite positive urine, this is concerning for E. coli infection.  Acute cystitis in male is rare I do have concerns for complicated UTI.  Patient given Keflex to take 4 times a day for 14 days due to concerns of complicated UTI.  Patient also given doxycycline and ceftriaxone for management of gonorrhea chlamydia.  Did discuss obtaining lab work for syphilis evaluation and HIV.  Patient " declines at this time.  Did state that he was tested a couple months ago negative.  Informed patient of the complicated UTI concern and that I wanted him to follow-up with urology for further evaluation to ensure that his symptoms are improving as this could become complicated prostatitis or pyelonephritis.  Patient was agreeable with this plan discharged home in stable condition at this time for further evaluation by urology, stressed the importance of taking the antibiotics for the full course.  Patient struck to return for any worsening dysuria fevers chills nausea vomiting abdominal pain or any other concerns.    Problems Addressed:  Acute cystitis without hematuria: complicated acute illness or injury  STD exposure: complicated acute illness or injury    Amount and/or Complexity of Data Reviewed  Labs:  Decision-making details documented in ED Course.    Risk  Prescription drug management.        See ED COURSE for additional MDM statements    EKG  None Performed     All EKG's are interpreted by the Emergency Department Physician who either signs or Co-signs this chart in the absence of a cardiologist.    Additional Scores                   EMERGENCY DEPARTMENT COURSE:    ED Course as of 01/08/25 0913   Tue Jan 07, 2025   1303 Nitrite, UA(!): Positive [CR]      ED Course User Index  [CR] Titus Lanier, DO       PROCEDURES:  None Performed   Procedures    DATA FOR LAB AND RADIOLOGY TESTS ORDERED BELOW ARE REVIEWED BY THE ED CLINICIAN:    RADIOLOGY: All x-rays, CT, MRI, and formal ultrasound images (except ED bedside ultrasound) are read by the radiologist, see reports below, unless otherwise noted in MDM or here.  Reports below are reviewed by myself.  No orders to display       LABS: Lab orders shown below, the results are reviewed by myself, and all abnormals are listed below.  Labs Reviewed   URINALYSIS WITHOUT MICROSCOPIC (NO CULTURE) - Abnormal; Notable for the following components:       Result  "Value    Appearance, UA Cloudy (*)     Blood, UA Small (1+) (*)     Protein, UA Trace (*)     Leuk Esterase, UA Moderate (2+) (*)     Nitrite, UA Positive (*)     All other components within normal limits   URINALYSIS, MICROSCOPIC ONLY - Abnormal; Notable for the following components:    WBC, UA 21-50 (*)     Bacteria, UA 3+ (*)     All other components within normal limits   CHLAMYDIA TRACHOMATIS, NEISSERIA GONORRHOEAE, PCR   URINE CULTURE   URINALYSIS AND MICROSCOPIC    Narrative:     The following orders were created for panel order Urinalysis With Microscopic - Urine, Clean Catch.  Procedure                               Abnormality         Status                     ---------                               -----------         ------                     Urinalysis without micro...[[id number]]  Abnormal            Final result               Urinalysis, Microscopic ...[[id number]]  Abnormal            Final result                 Please view results for these tests on the individual orders.       Vitals Reviewed:    Vitals:    01/07/25 1157   BP: 152/98   BP Location: Left arm   Patient Position: Sitting   Pulse: 97   Resp: 16   Temp: 98.2 °F (36.8 °C)   TempSrc: Oral   SpO2: 99%   Weight: 86.2 kg (190 lb 0.6 oz)   Height: 172.7 cm (67.99\")       MEDICATIONS GIVEN TO PATIENT THIS ENCOUNTER:  Medications   cefTRIAXone (ROCEPHIN) 500 mg in lidocaine (XYLOCAINE) 1 % 2 mL IM only syringe (500 mg Intramuscular Given 1/7/25 1331)       CONSULTS:  None    CRITICAL CARE:  There was significant risk of life threatening deterioration of patient's condition requiring my direct management. Critical care time 0 minutes, excluding any documented procedures.    FINAL IMPRESSION      1. Acute cystitis without hematuria    2. STD exposure          DISPOSITION / PLAN     ED Disposition       ED Disposition   Discharge    Condition   Stable    Comment   --               PATIENT REFERRED TO:  Ernesto Montano MD  [address] " [address] [address]  [phone]    Schedule an appointment as soon as possible for a visit in 1 week  For complex acute cystitis      DISCHARGE MEDICATIONS:     Medication List        START taking these medications      cephalexin 500 MG capsule  Commonly known as: KEFLEX  Take 1 capsule by mouth 4 (Four) Times a Day for 14 days.     doxycycline 100 MG capsule  Commonly known as: MONODOX  Take 1 capsule by mouth 2 (Two) Times a Day for 7 days.               Where to Get Your Medications        These medications were sent to [facility] [address] [place] [address] - [address] [facility] [phone] [place] [phone]   [address] [facility] [address]      Phone: [phone]   cephalexin 500 MG capsule  doxycycline 100 MG capsule         Electronically signed by Titus Lanier DO, 01/07/25, 12:07 PM EST.    Emergency Medicine Physician  [facility]  (Please note that portions of thisnote were completed with a voice recognition program.  Efforts were made to edit the dictations but occasionally words are mis-transcribed.)       Titus Lanier DO  01/08/25 0913

## 2025-01-07 NOTE — DISCHARGE INSTRUCTIONS
If you notice any concerning symptoms, please return to the ER immediately. These can include but are not limited to: worsening of you condition, fevers, chills, shortness of breath, vomiting, weakness of the extremities, changes in your mental status, numbness, pale extremities, or chest pain.     Take medications as prescribed, your pharmacist may have additional recommendations concerning these medications. If you are given an antibiotic, then make sure you get the prescription filled and take the antibiotics until finished, this is important to prevent antibiotic resistant diseases.  Drink plenty of water while taking the antibiotics.  Avoid drinking alcohol or drinks that have caffeine in it while taking antibiotics.      For pain use ibuprofen (Motrin) or acetaminophen (Tylenol), unless prescribed medications that also contain these medications.  You can take over the counter acetaminophen or ibuprofen, please follow the directions as dosages differ. Do not take ibuprofen if you have a history of peptic ulcers, kidney disease, bariatric surgery, or are currently pregnant.  Do not take Tylenol if you have a history of liver disease or alcohol use disorder.        THANK YOU!!! From [facility] Emergency Department    On behalf of the Emergency Department staff at [facility], I would like to thank you for giving us the opportunity to address your health care needs and concerns. We hope that during your visit, our service was delivered in a professional and caring manner. Please keep [facility] in mind as we walk with you down the path to your own personal wellness. Please expect follow-up phone calls concerning additional care and questions about your experience.      You have received additional information specific to your diagnosis in these discharge instructions, please read these fully.  Anytime you have been seen in the emergency department we recommend close follow up with your  primary care provider or specialist, please follow these directions as indicated.      Do not have sex with anyone to complete your course of antibiotics.  If you choose to have sex with previous partners make sure they are also treated as you can we receive the bacteria that you have already been infected with.

## 2025-01-09 LAB
BACTERIA SPEC AEROBE CULT: ABNORMAL
C TRACH RRNA SPEC QL NAA+PROBE: NEGATIVE
N GONORRHOEA RRNA SPEC QL NAA+PROBE: NEGATIVE

## 2025-02-18 ENCOUNTER — HOSPITAL ENCOUNTER (EMERGENCY)
Facility: HOSPITAL | Age: 50
Discharge: HOME | End: 2025-02-18
Attending: STUDENT IN AN ORGANIZED HEALTH CARE EDUCATION/TRAINING PROGRAM | Admitting: STUDENT IN AN ORGANIZED HEALTH CARE EDUCATION/TRAINING PROGRAM
Payer: MEDICARE

## 2025-02-18 VITALS
SYSTOLIC BLOOD PRESSURE: 159 MMHG | WEIGHT: 180 LBS | HEIGHT: 68 IN | TEMPERATURE: 97.7 F | OXYGEN SATURATION: 98 % | BODY MASS INDEX: 27.28 KG/M2 | RESPIRATION RATE: 16 BRPM | DIASTOLIC BLOOD PRESSURE: 106 MMHG | HEART RATE: 111 BPM

## 2025-02-18 DIAGNOSIS — Z20.2 POSSIBLE EXPOSURE TO STI: Primary | ICD-10-CM

## 2025-02-18 LAB
BILIRUB UR QL STRIP: NEGATIVE
CLARITY UR: CLEAR
COLOR UR: YELLOW
GLUCOSE UR STRIP-MCNC: NEGATIVE MG/DL
HGB UR QL STRIP.AUTO: NEGATIVE
KETONES UR QL STRIP: NEGATIVE
LEUKOCYTE ESTERASE UR QL STRIP.AUTO: NEGATIVE
NITRITE UR QL STRIP: NEGATIVE
PH UR STRIP.AUTO: 5.5 [PH] (ref 5–8)
PROT UR QL STRIP: NEGATIVE
SP GR UR STRIP: 1.01 (ref 1–1.03)
UROBILINOGEN UR QL STRIP: NORMAL

## 2025-02-18 PROCEDURE — 81003 URINALYSIS AUTO W/O SCOPE: CPT | Performed by: STUDENT IN AN ORGANIZED HEALTH CARE EDUCATION/TRAINING PROGRAM

## 2025-02-18 PROCEDURE — 99283 EMERGENCY DEPT VISIT LOW MDM: CPT | Performed by: STUDENT IN AN ORGANIZED HEALTH CARE EDUCATION/TRAINING PROGRAM

## 2025-02-18 PROCEDURE — 25010000002 LIDOCAINE PF 1% 1 % SOLUTION 5 ML VIAL: Performed by: STUDENT IN AN ORGANIZED HEALTH CARE EDUCATION/TRAINING PROGRAM

## 2025-02-18 PROCEDURE — 87591 N.GONORRHOEAE DNA AMP PROB: CPT | Performed by: STUDENT IN AN ORGANIZED HEALTH CARE EDUCATION/TRAINING PROGRAM

## 2025-02-18 PROCEDURE — 25010000002 CEFTRIAXONE PER 250 MG: Performed by: STUDENT IN AN ORGANIZED HEALTH CARE EDUCATION/TRAINING PROGRAM

## 2025-02-18 PROCEDURE — 87491 CHLMYD TRACH DNA AMP PROBE: CPT | Performed by: STUDENT IN AN ORGANIZED HEALTH CARE EDUCATION/TRAINING PROGRAM

## 2025-02-18 PROCEDURE — 96372 THER/PROPH/DIAG INJ SC/IM: CPT

## 2025-02-18 RX ORDER — DOXYCYCLINE 100 MG/1
100 CAPSULE ORAL 2 TIMES DAILY
Qty: 14 CAPSULE | Refills: 0 | Status: SHIPPED | OUTPATIENT
Start: 2025-02-18 | End: 2025-02-25

## 2025-02-18 RX ORDER — DOXYCYCLINE 100 MG/1
100 CAPSULE ORAL ONCE
Status: COMPLETED | OUTPATIENT
Start: 2025-02-18 | End: 2025-02-18

## 2025-02-18 RX ADMIN — LIDOCAINE HYDROCHLORIDE 500 MG: 10 INJECTION, SOLUTION EPIDURAL; INFILTRATION; INTRACAUDAL; PERINEURAL at 09:20

## 2025-02-18 RX ADMIN — DOXYCYCLINE 100 MG: 100 CAPSULE ORAL at 09:19

## 2025-02-18 NOTE — Clinical Note
[facility] EMERGENCY DEPARTMENT  [address] [facility] [address]  Phone: [phone]    Sajan Estrada was seen and treated in our emergency department on 2/18/2025.  He may return to work on 02/20/2025.         Thank you for choosing [facility].    Romel Sanches MD

## 2025-02-18 NOTE — DISCHARGE INSTRUCTIONS
You were evaluated due to concerns for burning while urinate.  We tested your urine and found no concern for a urinary tract infection but had concerns for a possible STI.  We treated you with a injection of Rocephin and doxycycline you are now stable for discharge.  Would recommend following with your primary care doctor to ensure that you improve appropriately.  We had recommended HIV testing which you have declined here and we would follow-up with your primary care doctor to discuss this.  If you have fever in spite of this antibiotic therapy please come back to the the Emergency Department for further evaluation.  You are stable for discharge.

## 2025-02-18 NOTE — Clinical Note
[facility] EMERGENCY DEPARTMENT  [address] [facility] [address]  Phone: [phone]    Sajan Estrada was seen and treated in our emergency department on 2/18/2025.  He may return to work on 02/20/2025.         Thank you for choosing [facility].    Romel Sanches MD

## 2025-02-18 NOTE — ED PROVIDER NOTES
Subjective:  History of Present Illness:    Patient is a 49-year-old male with no significant medical history presents today with dysuria, pelvic pain, concern for STI.  Reports that he recently had unprotected sex with a new partner.  Denies any fevers.  No chest pain or shortness of breath.  Denies any abdominal pain.  No leg swelling or leg pain.  Denies any purulence from his urethral meatus.  Well-appearing on exam.    Nurses Notes reviewed and agree, including vitals, allergies, social history and prior medical history.     REVIEW OF SYSTEMS: All systems reviewed and not pertinent unless noted.  Review of Systems   Constitutional:  Positive for activity change. Negative for appetite change, chills, fatigue and fever.   HENT:  Negative for congestion, sinus pressure, sneezing and trouble swallowing.    Eyes:  Negative for discharge and itching.   Respiratory:  Negative for cough and shortness of breath.    Cardiovascular:  Negative for chest pain and palpitations.   Gastrointestinal:  Negative for abdominal distention, abdominal pain, diarrhea, nausea and vomiting.   Endocrine: Negative for cold intolerance and heat intolerance.   Genitourinary:  Positive for dysuria. Negative for decreased urine volume and urgency.   Musculoskeletal:  Negative for gait problem, neck pain and neck stiffness.   Skin:  Negative for color change and rash.   Allergic/Immunologic: Negative for immunocompromised state.   Neurological:  Negative for facial asymmetry and headaches.   Hematological:  Negative for adenopathy.   Psychiatric/Behavioral:  Negative for self-injury and suicidal ideas.        Past Medical History:   Diagnosis Date    Anxiety        Allergies:    Codeine and Penicillins      No past surgical history on file.      Social History     Socioeconomic History    Marital status: Single   Tobacco Use    Smoking status: Former     Current packs/day: 0.00     Average packs/day: 0.5 packs/day for 14.7 years (7.3 ttl  "pk-yrs)     Types: Cigarettes     Start date: 2004     Quit date: 2019     Years since quittin.1     Passive exposure: Past   Vaping Use    Vaping status: Never Used   Substance and Sexual Activity    Alcohol use: Yes     Alcohol/week: 1.0 standard drink of alcohol     Types: 1 Cans of beer per week     Comment: 5 beers per month    Drug use: Not Currently     Types: Marijuana     Comment: last smoked marijuana around 2023    Sexual activity: Yes     Partners: Female         No family history on file.    Objective  Physical Exam:  BP (!) 159/106 (BP Location: Left arm, Patient Position: Sitting)   Pulse 111   Temp 97.7 °F (36.5 °C) (Oral)   Resp 16   Ht 172.7 cm (68\")   Wt 81.6 kg (180 lb)   SpO2 98%   BMI 27.37 kg/m²      Physical Exam  Constitutional:       General: He is not in acute distress.     Appearance: Normal appearance. He is normal weight. He is not ill-appearing.   HENT:      Head: Normocephalic and atraumatic.      Nose: Nose normal. No congestion or rhinorrhea.      Mouth/Throat:      Mouth: Mucous membranes are moist.      Pharynx: Oropharynx is clear.   Eyes:      Extraocular Movements: Extraocular movements intact.      Conjunctiva/sclera: Conjunctivae normal.      Pupils: Pupils are equal, round, and reactive to light.   Cardiovascular:      Rate and Rhythm: Normal rate and regular rhythm.      Pulses: Normal pulses.   Pulmonary:      Effort: Pulmonary effort is normal. No respiratory distress.      Breath sounds: Normal breath sounds.   Abdominal:      General: Abdomen is flat. Bowel sounds are normal. There is no distension.      Palpations: Abdomen is soft.      Tenderness: There is no abdominal tenderness. There is no guarding or rebound.   Musculoskeletal:         General: No swelling or tenderness. Normal range of motion.      Cervical back: Normal range of motion and neck supple. No rigidity or tenderness.   Skin:     General: Skin is warm and dry.      Capillary Refill: " Capillary refill takes less than 2 seconds.   Neurological:      General: No focal deficit present.      Mental Status: He is alert and oriented to person, place, and time. Mental status is at baseline.      Cranial Nerves: No cranial nerve deficit.      Sensory: No sensory deficit.      Motor: No weakness.   Psychiatric:         Mood and Affect: Mood normal.         Behavior: Behavior normal.         Thought Content: Thought content normal.         Judgment: Judgment normal.         Procedures    ED Course:         Lab Results (last 24 hours)       Procedure Component Value Units Date/Time    Urinalysis With Culture If Indicated - Urine, Clean Catch [[id number]]  (Normal) Collected: 02/18/25 0907    Specimen: Urine, Clean Catch Updated: 02/18/25 0916     Color, UA Yellow     Appearance, UA Clear     pH, UA 5.5     Specific Gravity, UA 1.011     Glucose, UA Negative     Ketones, UA Negative     Bilirubin, UA Negative     Blood, UA Negative     Protein, UA Negative     Leuk Esterase, UA Negative     Nitrite, UA Negative     Urobilinogen, UA 0.2 E.U./dL    Narrative:      In absence of clinical symptoms, the presence of pyuria, bacteria, and/or nitrites on the urinalysis result does not correlate with infection.  Urine microscopic not indicated.    Chlamydia trachomatis, Neisseria gonorrhoeae, PCR - Urine, Urine, Clean Catch [[id number]] Collected: 02/18/25 0907    Specimen: Urine, Clean Catch Updated: 02/18/25 0913             No radiology results from the last 24 hrs       MDM      Initial impression of presenting illness: Dysuria    DDX: includes but is not limited to: Urinary tract infection, STI, pyelonephritis, HIV    Patient arrives stable with vitals interpreted by myself.     Pertinent features from physical exam: Clear to auscultation, regular rate and rhythm, no murmur, nontender to abdominal palpation.    Initial diagnostic plan: UA, gonorrhea chlamydia swab, patient offered HIV testing given his recurrent  visits to emergency department with concerns for STI.  Patient declines this at this time.  States he would prefer to follow with his primary care doctor for this testing.    Results from initial plan were reviewed and interpreted by me revealing no concern for urinary tract infection    Diagnostic information from other sources: Reviewed past medical records including patient's last ER visit for similar symptoms    Interventions / Re-evaluation: Given Rocephin, Doxycyclin    Results/clinical rationale were discussed with patient at bedside    Consultations/Discussion of results with other physicians: Discussed no concern for urinary tract infection however, given patient's presentation do have concerns for STI.  Will treat empirically and have patient follow with his primary care doctor.  Had recommended HIV testing which patient declines.  We have recommended follow-up with his primary care doctor for this testing.  Strict turn precaution for fevers in spite of antibiotic therapy.    Disposition plan: Discharge  -----        Final diagnoses:   Possible exposure to STI          Romel Sanches MD  02/18/25 5749

## 2025-02-19 LAB
C TRACH RRNA SPEC QL NAA+PROBE: NEGATIVE
N GONORRHOEA RRNA SPEC QL NAA+PROBE: NEGATIVE

## 2025-03-31 ENCOUNTER — HOSPITAL ENCOUNTER (EMERGENCY)
Facility: HOSPITAL | Age: 50
Discharge: HOME | End: 2025-03-31
Attending: STUDENT IN AN ORGANIZED HEALTH CARE EDUCATION/TRAINING PROGRAM | Admitting: STUDENT IN AN ORGANIZED HEALTH CARE EDUCATION/TRAINING PROGRAM
Payer: MEDICARE

## 2025-03-31 VITALS
WEIGHT: 185 LBS | HEART RATE: 116 BPM | RESPIRATION RATE: 18 BRPM | TEMPERATURE: 97.5 F | HEIGHT: 68 IN | DIASTOLIC BLOOD PRESSURE: 105 MMHG | SYSTOLIC BLOOD PRESSURE: 153 MMHG | OXYGEN SATURATION: 97 % | BODY MASS INDEX: 28.04 KG/M2

## 2025-03-31 DIAGNOSIS — Z20.2 POSSIBLE EXPOSURE TO STI: Primary | ICD-10-CM

## 2025-03-31 LAB
BILIRUB UR QL STRIP: NEGATIVE
C TRACH RRNA CVX QL NAA+PROBE: NOT DETECTED
CLARITY UR: CLEAR
COLOR UR: YELLOW
GLUCOSE UR STRIP-MCNC: NEGATIVE MG/DL
HGB UR QL STRIP.AUTO: NEGATIVE
KETONES UR QL STRIP: NEGATIVE
LEUKOCYTE ESTERASE UR QL STRIP.AUTO: NEGATIVE
N GONORRHOEA RRNA SPEC QL NAA+PROBE: NOT DETECTED
NITRITE UR QL STRIP: NEGATIVE
PH UR STRIP.AUTO: 5.5 [PH] (ref 5–8)
PROT UR QL STRIP: NEGATIVE
SP GR UR STRIP: 1.01 (ref 1–1.03)
UROBILINOGEN UR QL STRIP: NORMAL

## 2025-03-31 PROCEDURE — 96372 THER/PROPH/DIAG INJ SC/IM: CPT

## 2025-03-31 PROCEDURE — 87591 N.GONORRHOEAE DNA AMP PROB: CPT | Performed by: NURSE PRACTITIONER

## 2025-03-31 PROCEDURE — 87491 CHLMYD TRACH DNA AMP PROBE: CPT | Performed by: NURSE PRACTITIONER

## 2025-03-31 PROCEDURE — 99283 EMERGENCY DEPT VISIT LOW MDM: CPT | Performed by: STUDENT IN AN ORGANIZED HEALTH CARE EDUCATION/TRAINING PROGRAM

## 2025-03-31 PROCEDURE — 81003 URINALYSIS AUTO W/O SCOPE: CPT | Performed by: NURSE PRACTITIONER

## 2025-03-31 PROCEDURE — 25010000002 CEFTRIAXONE PER 250 MG: Performed by: NURSE PRACTITIONER

## 2025-03-31 PROCEDURE — 25010000002 LIDOCAINE PF 1% 1 % SOLUTION 5 ML VIAL: Performed by: NURSE PRACTITIONER

## 2025-03-31 RX ORDER — DOXYCYCLINE 100 MG/1
100 CAPSULE ORAL 2 TIMES DAILY
Qty: 14 CAPSULE | Refills: 0 | Status: SHIPPED | OUTPATIENT
Start: 2025-03-31 | End: 2025-04-07

## 2025-03-31 RX ADMIN — LIDOCAINE HYDROCHLORIDE 1 G: 10 INJECTION, SOLUTION EPIDURAL; INFILTRATION; INTRACAUDAL; PERINEURAL at 11:41

## 2025-03-31 NOTE — DISCHARGE INSTRUCTIONS
Will be contacted via phone for the next couple days for STI panel.  Will have you follow-up with your PCP within the week.  Follow-up ER for new or symptoms.

## 2025-03-31 NOTE — ED PROVIDER NOTES
"Subjective:  History of Present Illness:    Patient is a 49-year-old male without contributing health history.  Presents to the ER today with dysuria.  Patient reports that he had new sexual partner approximately 5 days ago.  He denies discharge.  Denies fever.  Denies abdominal pain.  Denies any other associated symptom.  Denies OTC medication or home remedy.  Denies alleviating or exacerbating factors.    Nurses Notes reviewed and agree, including vitals, allergies, social history and prior medical history.     REVIEW OF SYSTEMS: All systems reviewed and not pertinent unless noted.  Review of Systems   Genitourinary:  Positive for dysuria.   All other systems reviewed and are negative.      Past Medical History:   Diagnosis Date    Anxiety        Allergies:    Codeine and Penicillins      No past surgical history on file.      Social History     Socioeconomic History    Marital status: Single   Tobacco Use    Smoking status: Former     Current packs/day: 0.00     Average packs/day: 0.5 packs/day for 14.7 years (7.3 ttl pk-yrs)     Types: Cigarettes     Start date: 2004     Quit date: 2019     Years since quittin.2     Passive exposure: Past   Vaping Use    Vaping status: Never Used   Substance and Sexual Activity    Alcohol use: Yes     Alcohol/week: 1.0 standard drink of alcohol     Types: 1 Cans of beer per week     Comment: 5 beers per month    Drug use: Not Currently     Types: Marijuana     Comment: last smoked marijuana around 2023    Sexual activity: Yes     Partners: Female         No family history on file.    Objective  Physical Exam:  BP (!) 153/105 (BP Location: Left arm, Patient Position: Sitting)   Pulse 116   Temp 97.5 °F (36.4 °C) (Oral)   Resp 18   Ht 172.7 cm (68\")   Wt 83.9 kg (185 lb)   SpO2 97%   BMI 28.13 kg/m²      Physical Exam  Vitals and nursing note reviewed.   Constitutional:       Appearance: Normal appearance. He is normal weight.   HENT:      Head: Normocephalic " and atraumatic.      Nose: Nose normal.      Mouth/Throat:      Mouth: Mucous membranes are moist.      Pharynx: Oropharynx is clear.   Eyes:      Extraocular Movements: Extraocular movements intact.      Conjunctiva/sclera: Conjunctivae normal.      Pupils: Pupils are equal, round, and reactive to light.   Cardiovascular:      Rate and Rhythm: Normal rate and regular rhythm.   Pulmonary:      Effort: Pulmonary effort is normal.      Breath sounds: Normal breath sounds.   Abdominal:      General: Abdomen is flat. Bowel sounds are normal.      Palpations: Abdomen is soft.   Musculoskeletal:         General: Normal range of motion.      Cervical back: Normal range of motion and neck supple.   Skin:     General: Skin is warm and dry.      Capillary Refill: Capillary refill takes less than 2 seconds.   Neurological:      General: No focal deficit present.      Mental Status: He is alert and oriented to person, place, and time. Mental status is at baseline.   Psychiatric:         Mood and Affect: Mood normal.         Behavior: Behavior normal.         Thought Content: Thought content normal.         Judgment: Judgment normal.         Procedures    ED Course:    ED Course as of 03/31/25 1250   Mon Mar 31, 2025   1146 I have reviewed the mid-level provider(s) note and verbally discussed the case/plan of care.  I was available for consultation as needed at all times during the patient's visit in the Emergency Department.  I agree with the clinical impression, plan, and disposition unless otherwise stated in the MDM below.    ATTENDING ATTESTATION  HPI: 49-year-old male presents with painful urination.  Reportedly had nonproductive sex with new partner 5 days ago. No other associated symptoms.    MDM: ED workup reviewed.    I have reviewed the labs results. UA negative for hematuria or infection. Gonorrhea/chlamydia testing pending.           [JS]      ED Course User Index  [JS] Durga Hui DO       Lab Results (last 24  hours)       ** No results found for the last 24 hours. **             No radiology results from the last 24 hrs       MDM     Amount and/or Complexity of Data Reviewed  Clinical lab tests: reviewed        Initial impression of presenting illness: Patient is a 49-year-old male without contributing health history.  Presents to the ER today with dysuria.  Patient reports that he had new sexual partner approximately 5 days ago.  He denies discharge.  Denies fever.  Denies abdominal pain.  Denies any other associated symptom.  Denies OTC medication or home remedy.  Denies alleviating or exacerbating factors.    DDX: includes but is not limited to: STI, acute UTI, prostatitis or other    Patient arrives stable with vitals interpreted by myself.     Pertinent features from physical exam: Unremarkable.    Initial diagnostic plan: Urinalysis, GC panel,    Results from initial plan were reviewed and interpreted by me revealing urinalysis unremarkable.  GC panel is pending.    Diagnostic information from other sources: Chart review    Interventions / Re-evaluation: Vital signs stable throughout encounter.  Patient received 1 g Rocephin.  1 week doxycycline    Results/clinical rationale were discussed with patient    Consultations/Discussion of results with other physicians: N/A    Disposition plan: Patient is hemodynamically stable nontoxic-appearing appropriate discharge.  Let patient follow-up with PCP within the week.  Follow-up ER for new or worse.  Have instructed patient that he will be called with results from urinary testing.  -----        Final diagnoses:   Possible exposure to STI          Louie Aguayo, APRN  03/31/25 1958

## 2025-04-26 ENCOUNTER — HOSPITAL ENCOUNTER (EMERGENCY)
Facility: HOSPITAL | Age: 50
Discharge: HOME | End: 2025-04-26
Attending: STUDENT IN AN ORGANIZED HEALTH CARE EDUCATION/TRAINING PROGRAM
Payer: MEDICARE

## 2025-04-26 VITALS
TEMPERATURE: 98 F | WEIGHT: 184.97 LBS | HEART RATE: 107 BPM | RESPIRATION RATE: 17 BRPM | BODY MASS INDEX: 28.03 KG/M2 | HEIGHT: 68 IN | DIASTOLIC BLOOD PRESSURE: 106 MMHG | OXYGEN SATURATION: 96 % | SYSTOLIC BLOOD PRESSURE: 154 MMHG

## 2025-04-26 DIAGNOSIS — R30.0 DYSURIA: Primary | ICD-10-CM

## 2025-04-26 LAB
BILIRUB UR QL STRIP: NEGATIVE
C TRACH RRNA CVX QL NAA+PROBE: NOT DETECTED
CLARITY UR: CLEAR
COLOR UR: YELLOW
GLUCOSE UR STRIP-MCNC: NEGATIVE MG/DL
HGB UR QL STRIP.AUTO: NEGATIVE
KETONES UR QL STRIP: NEGATIVE
LEUKOCYTE ESTERASE UR QL STRIP.AUTO: NEGATIVE
N GONORRHOEA RRNA SPEC QL NAA+PROBE: NOT DETECTED
NITRITE UR QL STRIP: NEGATIVE
PH UR STRIP.AUTO: 5.5 [PH] (ref 5–8)
PROT UR QL STRIP: NEGATIVE
SP GR UR STRIP: <=1.005 (ref 1–1.03)
UROBILINOGEN UR QL STRIP: NORMAL

## 2025-04-26 PROCEDURE — 87491 CHLMYD TRACH DNA AMP PROBE: CPT | Performed by: STUDENT IN AN ORGANIZED HEALTH CARE EDUCATION/TRAINING PROGRAM

## 2025-04-26 PROCEDURE — 96372 THER/PROPH/DIAG INJ SC/IM: CPT

## 2025-04-26 PROCEDURE — 25010000002 LIDOCAINE PF 1% 1 % SOLUTION 5 ML VIAL: Performed by: STUDENT IN AN ORGANIZED HEALTH CARE EDUCATION/TRAINING PROGRAM

## 2025-04-26 PROCEDURE — 99283 EMERGENCY DEPT VISIT LOW MDM: CPT | Performed by: STUDENT IN AN ORGANIZED HEALTH CARE EDUCATION/TRAINING PROGRAM

## 2025-04-26 PROCEDURE — 87591 N.GONORRHOEAE DNA AMP PROB: CPT | Performed by: STUDENT IN AN ORGANIZED HEALTH CARE EDUCATION/TRAINING PROGRAM

## 2025-04-26 PROCEDURE — 81003 URINALYSIS AUTO W/O SCOPE: CPT | Performed by: STUDENT IN AN ORGANIZED HEALTH CARE EDUCATION/TRAINING PROGRAM

## 2025-04-26 PROCEDURE — 25010000002 CEFTRIAXONE PER 250 MG: Performed by: STUDENT IN AN ORGANIZED HEALTH CARE EDUCATION/TRAINING PROGRAM

## 2025-04-26 RX ORDER — DOXYCYCLINE 100 MG/1
100 CAPSULE ORAL ONCE
Status: COMPLETED | OUTPATIENT
Start: 2025-04-26 | End: 2025-04-26

## 2025-04-26 RX ORDER — DOXYCYCLINE 100 MG/1
100 CAPSULE ORAL 2 TIMES DAILY
Qty: 14 CAPSULE | Refills: 0 | Status: SHIPPED | OUTPATIENT
Start: 2025-04-26 | End: 2025-05-03

## 2025-04-26 RX ADMIN — LIDOCAINE HYDROCHLORIDE 500 MG: 10 INJECTION, SOLUTION EPIDURAL; INFILTRATION; INTRACAUDAL; PERINEURAL at 09:26

## 2025-04-26 RX ADMIN — DOXYCYCLINE 100 MG: 100 CAPSULE ORAL at 09:26

## 2025-04-26 NOTE — ED PROVIDER NOTES
Subjective:  History of Present Illness:    Patient is a 49-year-old male with history of anxiety, past presentations to our emergency department with concerns for STI who presents today with dysuria.  Reports increased dysuria over the last 24 hours.  Reports that he had unprotected sex with a new partner last week and is concerned that he may have an STI.  Now presents to our emergency department.  Denies any preceding fevers.  No chest pain or shortness of breath.  Denies any abdominal pain nausea vomiting or diarrhea.  Well-appearing on exam.      Nurses Notes reviewed and agree, including vitals, allergies, social history and prior medical history.     REVIEW OF SYSTEMS: All systems reviewed and not pertinent unless noted.  Review of Systems   Constitutional:  Negative for activity change, appetite change, chills, fatigue and fever.   HENT:  Negative for congestion, sinus pressure, sneezing and trouble swallowing.    Eyes:  Negative for discharge and itching.   Respiratory:  Negative for cough and shortness of breath.    Cardiovascular:  Negative for chest pain and palpitations.   Gastrointestinal:  Negative for abdominal distention and abdominal pain.   Endocrine: Negative for cold intolerance and heat intolerance.   Genitourinary:  Positive for dysuria. Negative for decreased urine volume and urgency.   Musculoskeletal:  Negative for gait problem, neck pain and neck stiffness.   Skin:  Negative for color change and rash.   Allergic/Immunologic: Negative for immunocompromised state.   Neurological:  Negative for facial asymmetry and headaches.   Hematological:  Negative for adenopathy.   Psychiatric/Behavioral:  Negative for self-injury and suicidal ideas.        Past Medical History:   Diagnosis Date    Anxiety        Allergies:    Codeine and Penicillins      History reviewed. No pertinent surgical history.      Social History     Socioeconomic History    Marital status: Single   Tobacco Use    Smoking  "status: Former     Current packs/day: 0.00     Average packs/day: 0.5 packs/day for 14.7 years (7.3 ttl pk-yrs)     Types: Cigarettes     Start date: 2004     Quit date: 2019     Years since quittin.3     Passive exposure: Past   Vaping Use    Vaping status: Never Used   Substance and Sexual Activity    Alcohol use: Yes     Alcohol/week: 1.0 standard drink of alcohol     Types: 1 Cans of beer per week     Comment: 5 beers per month    Drug use: Not Currently     Types: Marijuana     Comment: last smoked marijuana around 2023    Sexual activity: Yes     Partners: Female         History reviewed. No pertinent family history.    Objective  Physical Exam:  BP (!) 154/106 (BP Location: Left arm, Patient Position: Sitting)   Pulse 107   Temp 98 °F (36.7 °C) (Oral)   Resp 17   Ht 172.7 cm (68\")   Wt 83.9 kg (184 lb 15.5 oz)   SpO2 96%   BMI 28.12 kg/m²      Physical Exam  Constitutional:       General: He is not in acute distress.     Appearance: Normal appearance. He is normal weight. He is not ill-appearing.   HENT:      Head: Normocephalic and atraumatic.      Nose: Nose normal. No congestion or rhinorrhea.      Mouth/Throat:      Mouth: Mucous membranes are moist.      Pharynx: Oropharynx is clear.   Eyes:      Extraocular Movements: Extraocular movements intact.      Conjunctiva/sclera: Conjunctivae normal.      Pupils: Pupils are equal, round, and reactive to light.   Cardiovascular:      Rate and Rhythm: Normal rate and regular rhythm.      Pulses: Normal pulses.   Pulmonary:      Effort: Pulmonary effort is normal. No respiratory distress.      Breath sounds: Normal breath sounds.   Abdominal:      General: Abdomen is flat. Bowel sounds are normal. There is no distension.      Palpations: Abdomen is soft.      Tenderness: There is no abdominal tenderness. There is no guarding or rebound.   Musculoskeletal:         General: No swelling or tenderness. Normal range of motion.      Cervical back: " Normal range of motion and neck supple. No rigidity or tenderness.   Skin:     General: Skin is warm and dry.      Capillary Refill: Capillary refill takes less than 2 seconds.   Neurological:      General: No focal deficit present.      Mental Status: He is alert and oriented to person, place, and time. Mental status is at baseline.      Cranial Nerves: No cranial nerve deficit.      Sensory: No sensory deficit.      Motor: No weakness.      Coordination: Coordination normal.      Gait: Gait normal.   Psychiatric:         Mood and Affect: Mood normal.         Behavior: Behavior normal.         Thought Content: Thought content normal.         Judgment: Judgment normal.         Procedures    ED Course:         Lab Results (last 24 hours)       Procedure Component Value Units Date/Time    Urinalysis With Culture If Indicated - Urine, Clean Catch [[id number]]  (Normal) Collected: 04/26/25 0912    Specimen: Urine, Clean Catch Updated: 04/26/25 0926     Color, UA Yellow     Appearance, UA Clear     pH, UA 5.5     Specific Gravity, UA <=1.005     Glucose, UA Negative     Ketones, UA Negative     Bilirubin, UA Negative     Blood, UA Negative     Protein, UA Negative     Leuk Esterase, UA Negative     Nitrite, UA Negative     Urobilinogen, UA 0.2 E.U./dL    Narrative:      In absence of clinical symptoms, the presence of pyuria, bacteria, and/or nitrites on the urinalysis result does not correlate with infection.  Urine microscopic not indicated.    Chlamydia trachomatis, Neisseria gonorrhoeae, PCR - Urine, Urine, Clean Catch [[id number]] Collected: 04/26/25 0912    Specimen: Urine, Clean Catch Updated: 04/26/25 0920             No radiology results from the last 24 hrs       MDM      Initial impression of presenting illness: Dysuria    DDX: includes but is not limited to: Urinary tract infection, nephrolithiasis, STI    Patient arrives stable with vitals interpreted by myself.     Pertinent features from physical exam: Clear  to auscultation, regular rate and rhythm, no murmur, nontender to abdominal palpation, ambulatory and in no distress.    Initial diagnostic plan: UA, GC swab    Results from initial plan were reviewed and interpreted by me revealing UA clear    Diagnostic information from other sources: Reviewed past medical records    Interventions / Re-evaluation: Given concerns for STIs given injection of Rocephin, doxycycline in the emergency department    Results/clinical rationale were discussed with patient at bedside    Consultations/Discussion of results with other physicians: Discussed negative UA in emergency department however, given patient's symptoms did discuss concern for STI.  GC swab is still pending and this was discussed with patient, will be called with positive result.  In the meantime we will treat for gonorrhea committee a, Rocephin injection given in our emergency department and will treat with doxycycline for the next 7 days.  First dose given in emergency department.  Strict return precaution for abdominal pain, fevers in spite of antibiotic therapy.    Disposition plan: Discharge  -----        Final diagnoses:   Dysuria          Romel Sanches MD  04/26/25 1120

## 2025-04-26 NOTE — Clinical Note
[facility] EMERGENCY DEPARTMENT  [address] [address]  Phone: [phone]    Sajan Estrada was seen and treated in our emergency department on 4/26/2025.  He may return to work on 04/29/2025.         Thank you for choosing [facility].    Romel Sanches MD

## 2025-04-26 NOTE — DISCHARGE INSTRUCTIONS
You were evaluated for burning while you urinate.  We tested your urine and gave you antibiotics to help treat a sexually transmitted infection.  You are now stable for discharge.  The test we run looking for gonorrhea and chlamydia is still pending.  However, we will treat you as though these infections are present.  We have given your first dose of oral antibiotic in emergency department and would encourage you to continue take your oral antibiotic as directed and follow with your primary care doctor to ensure that you improve appropriately.  If you have significant abdominal pain or fevers in spite of this antibiotic therapy please come back to the to the emergency department for further evaluation.  Do not stable for discharge.